# Patient Record
Sex: MALE | Race: WHITE | NOT HISPANIC OR LATINO | Employment: FULL TIME | URBAN - METROPOLITAN AREA
[De-identification: names, ages, dates, MRNs, and addresses within clinical notes are randomized per-mention and may not be internally consistent; named-entity substitution may affect disease eponyms.]

---

## 2019-10-25 ENCOUNTER — APPOINTMENT (OUTPATIENT)
Dept: GENERAL RADIOLOGY | Facility: HOSPITAL | Age: 59
End: 2019-10-25

## 2019-10-25 ENCOUNTER — HOSPITAL ENCOUNTER (OUTPATIENT)
Dept: GENERAL RADIOLOGY | Facility: HOSPITAL | Age: 59
Discharge: HOME | End: 2019-10-25
Admitting: CHIROPRACTOR

## 2019-10-25 ENCOUNTER — HOSPITAL ENCOUNTER (OUTPATIENT)
Dept: GENERAL RADIOLOGY | Facility: HOSPITAL | Age: 59
Discharge: HOME | End: 2019-10-25

## 2019-10-25 ENCOUNTER — TRANSCRIBE ORDERS (OUTPATIENT)
Dept: ADMINISTRATIVE | Facility: HOSPITAL | Age: 59
End: 2019-10-25

## 2019-10-25 ENCOUNTER — LAB (OUTPATIENT)
Dept: LAB | Facility: HOSPITAL | Age: 59
End: 2019-10-25

## 2019-10-25 DIAGNOSIS — M54.9 SEVERE BACK PAIN: Primary | ICD-10-CM

## 2019-10-25 DIAGNOSIS — Z82.69 FAMILY HISTORY OF ANKYLOSING SPONDYLITIS: ICD-10-CM

## 2019-10-25 DIAGNOSIS — Z87.39 HX OF ANKYLOSING SPONDYLITIS: ICD-10-CM

## 2019-10-25 DIAGNOSIS — M54.9 SEVERE BACK PAIN: ICD-10-CM

## 2019-10-25 DIAGNOSIS — M54.50 SEVERE LOW BACK PAIN: Primary | ICD-10-CM

## 2019-10-25 LAB
BASOPHILS # BLD AUTO: 0.07 10*3/MM3 (ref 0–0.2)
BASOPHILS NFR BLD AUTO: 1 % (ref 0–1.5)
CRP SERPL-MCNC: 0.4 MG/DL (ref 0–0.5)
DEPRECATED RDW RBC AUTO: 40.8 FL (ref 37–54)
EOSINOPHIL # BLD AUTO: 0.34 10*3/MM3 (ref 0–0.4)
EOSINOPHIL NFR BLD AUTO: 4.8 % (ref 0.3–6.2)
ERYTHROCYTE [DISTWIDTH] IN BLOOD BY AUTOMATED COUNT: 13.1 % (ref 12.3–15.4)
ERYTHROCYTE [SEDIMENTATION RATE] IN BLOOD: 3 MM/HR (ref 0–20)
HCT VFR BLD AUTO: 43.9 % (ref 37.5–51)
HGB BLD-MCNC: 14.8 G/DL (ref 13–17.7)
IMM GRANULOCYTES # BLD AUTO: 0.05 10*3/MM3 (ref 0–0.05)
IMM GRANULOCYTES NFR BLD AUTO: 0.7 % (ref 0–0.5)
LYMPHOCYTES # BLD AUTO: 0.86 10*3/MM3 (ref 0.7–3.1)
LYMPHOCYTES NFR BLD AUTO: 12.1 % (ref 19.6–45.3)
MCH RBC QN AUTO: 29.1 PG (ref 26.6–33)
MCHC RBC AUTO-ENTMCNC: 33.7 G/DL (ref 31.5–35.7)
MCV RBC AUTO: 86.2 FL (ref 79–97)
MONOCYTES # BLD AUTO: 0.68 10*3/MM3 (ref 0.1–0.9)
MONOCYTES NFR BLD AUTO: 9.6 % (ref 5–12)
NEUTROPHILS # BLD AUTO: 5.11 10*3/MM3 (ref 1.7–7)
NEUTROPHILS NFR BLD AUTO: 71.8 % (ref 42.7–76)
NRBC BLD AUTO-RTO: 0 /100 WBC (ref 0–0.2)
PLATELET # BLD AUTO: 240 10*3/MM3 (ref 140–450)
PMV BLD AUTO: 11.1 FL (ref 6–12)
RBC # BLD AUTO: 5.09 10*6/MM3 (ref 4.14–5.8)
WBC NRBC COR # BLD: 7.11 10*3/MM3 (ref 3.4–10.8)

## 2019-10-25 PROCEDURE — 85652 RBC SED RATE AUTOMATED: CPT

## 2019-10-25 PROCEDURE — 81374 HLA I TYPING 1 ANTIGEN LR: CPT

## 2019-10-25 PROCEDURE — 72170 X-RAY EXAM OF PELVIS: CPT

## 2019-10-25 PROCEDURE — 85025 COMPLETE CBC W/AUTO DIFF WBC: CPT

## 2019-10-25 PROCEDURE — 72110 X-RAY EXAM L-2 SPINE 4/>VWS: CPT

## 2019-10-25 PROCEDURE — 86140 C-REACTIVE PROTEIN: CPT

## 2019-10-25 PROCEDURE — 36415 COLL VENOUS BLD VENIPUNCTURE: CPT

## 2019-10-30 LAB — HLA-B27 QL NAA+PROBE: NEGATIVE

## 2023-09-01 ENCOUNTER — OFFICE VISIT (OUTPATIENT)
Dept: FAMILY MEDICINE CLINIC | Facility: CLINIC | Age: 63
End: 2023-09-01
Payer: COMMERCIAL

## 2023-09-01 VITALS
HEIGHT: 74 IN | OXYGEN SATURATION: 96 % | DIASTOLIC BLOOD PRESSURE: 92 MMHG | SYSTOLIC BLOOD PRESSURE: 132 MMHG | TEMPERATURE: 97.7 F | BODY MASS INDEX: 38.12 KG/M2 | WEIGHT: 297 LBS | HEART RATE: 72 BPM

## 2023-09-01 DIAGNOSIS — M79.671 RIGHT FOOT PAIN: ICD-10-CM

## 2023-09-01 DIAGNOSIS — L81.9 CHANGE IN PIGMENTED LESION OF FACE: ICD-10-CM

## 2023-09-01 DIAGNOSIS — I10 PRIMARY HYPERTENSION: ICD-10-CM

## 2023-09-01 DIAGNOSIS — M77.31 CALCANEAL SPUR OF RIGHT FOOT: ICD-10-CM

## 2023-09-01 DIAGNOSIS — E23.7 PITUITARY DISORDER: ICD-10-CM

## 2023-09-01 DIAGNOSIS — Z76.89 ENCOUNTER TO ESTABLISH CARE: Primary | ICD-10-CM

## 2023-09-01 RX ORDER — DESMOPRESSIN ACETATE 0.1 MG/1
TABLET ORAL
COMMUNITY
Start: 2013-08-15 | End: 2023-09-01 | Stop reason: SDUPTHER

## 2023-09-01 RX ORDER — LISINOPRIL 20 MG/1
1 TABLET ORAL DAILY
COMMUNITY
Start: 2023-07-23

## 2023-09-01 RX ORDER — DESMOPRESSIN ACETATE 0.1 MG/1
TABLET ORAL
Status: CANCELLED | OUTPATIENT
Start: 2023-09-01

## 2023-09-01 RX ORDER — BROMPHENIRAMINE MALEATE, PSEUDOEPHEDRINE HYDROCHLORIDE, AND DEXTROMETHORPHAN HYDROBROMIDE 2; 30; 10 MG/5ML; MG/5ML; MG/5ML
5 SYRUP ORAL 4 TIMES DAILY PRN
Qty: 150 ML | Refills: 1 | Status: SHIPPED | OUTPATIENT
Start: 2023-09-01

## 2023-09-01 RX ORDER — DESMOPRESSIN ACETATE 0.1 MG/1
0.1 TABLET ORAL 3 TIMES DAILY
Qty: 270 TABLET | Refills: 3 | Status: SHIPPED | OUTPATIENT
Start: 2023-09-01

## 2023-09-01 RX ORDER — AZITHROMYCIN 250 MG/1
TABLET, FILM COATED ORAL
Qty: 6 TABLET | Refills: 0 | Status: SHIPPED | OUTPATIENT
Start: 2023-09-01 | End: 2023-09-06

## 2023-09-01 NOTE — PROGRESS NOTES
"Chief Complaint  Establish Care, Hypertension, and Cough    Subjective          Sean Pedersen presents to [facility]      History of Present Illness    Paulie is a 63-year-old male patient who presents today to establish care, obtain med refill, and discuss cough.  Formally of Dr. Roberts, Randi Rizo and Rosemary Rivera.     Past medical history of hypertension. BP today 132/92.  Takes lisinopril 20 mg daily.  He is not in need of refill of that today.    He has pituitary gland dysfunction. Dx several years ago. Takes DDAVP and is in need of refill of this medication.    He has been with cough for two and half weeks. New coworker was in office and sick. He developed symptoms and took several Covid tests all of which were negative. Cough is productive and throat is scratchy. Cough was worse at night but has improved overall. Was taking NyQuil but stopped three nights ago. No fevers, chills, N/V/D.     Right foot with pain at arch for several years. Worse over time but some days are better than others.  Aggravated by certain movements.    He does report macular lesion on right cheek that has been there for some time however it does get painful at times and other times without pain.  Has not grown in size.    Objective     Vital Signs:   /92 (BP Location: Right arm, Patient Position: Sitting, Cuff Size: Adult)   Pulse 72   Temp 97.7 øF (36.5 øC) (Infrared)   Ht 188 cm (74\")   Wt 135 kg (297 lb)   SpO2 96%   BMI 38.13 kg/mý           Physical Exam  Vitals reviewed.   Constitutional:       Appearance: He is well-developed. He is obese.      Comments:      HENT:      Head: Normocephalic and atraumatic.      Nose: Nose normal.      Mouth/Throat:      Mouth: Mucous membranes are moist.      Pharynx: Oropharynx is clear.   Eyes:      Conjunctiva/sclera: Conjunctivae normal.      Pupils: Pupils are equal, round, and reactive to light.   Cardiovascular:      Rate and " Rhythm: Normal rate and regular rhythm.      Pulses: Normal pulses.      Heart sounds: Normal heart sounds. No murmur heard.  Pulmonary:      Effort: Pulmonary effort is normal. No respiratory distress.      Breath sounds: Normal breath sounds.   Musculoskeletal:         General: Normal range of motion.      Cervical back: Normal range of motion.      Right foot: Normal range of motion. No deformity, bunion, Charcot foot, foot drop or prominent metatarsal heads.        Feet:    Feet:      Comments: Tenderness of foot  Skin:     General: Skin is warm and dry.      Findings: Lesion present. No rash.             Comments: 0.25 cm asymmetric shaped macular lesion brown with jagged borders.  Slight color variations of light brown to dark brown.  No elevation.   Neurological:      Mental Status: He is alert and oriented to person, place, and time.   Psychiatric:         Behavior: Behavior normal.              Result Review :                                   Assessment and Plan      Diagnoses and all orders for this visit:    1. Encounter to establish care (Primary)    2. Pituitary disorder  -     desmopressin (DDAVP) 0.1 MG tablet; Take 1 tablet by mouth 3 (Three) Times a Day.  Dispense: 270 tablet; Refill: 3    3. Primary hypertension    4. Change in pigmented lesion of face  -     Ambulatory Referral to Dermatology    5. Right foot pain  Assessment & Plan:  Narrative & Impression   XR FOOT 3+ VW RIGHT     Date of Exam: 9/1/2023 9:29 AM EDT     Indication: right foot pain     Comparison: None available.     Findings:  No right foot fracture or joint malalignment is seen. There are small to moderate spurs at the Achilles and plantar tendon insertions upon the posterior calcaneus. Plantar arch is maintained. There is mild degenerative spurring at the dorsum of the   midfoot. Tibiotalar alignment is normal. There is mild degenerative narrowing of the first MTP joint and mild generalized narrowing of the interphalangeal  joints, without significant spurring. No erosive changes are identified. There are no osteolytic or   osteoblastic abnormalities.     IMPRESSION:  Impression:     1. Small to moderate spurs of the posterior calcaneus at the Achilles and plantar tendon insertions.  2. Mild degenerative changes of the forefoot and midfoot.  3. No acute findings       Orders:  -     XR Foot 3+ View Right (In Office)    6. Calcaneal spur of right foot    Other orders  -     brompheniramine-pseudoephedrine-DM 30-2-10 MG/5ML syrup; Take 5 mL by mouth 4 (Four) Times a Day As Needed for Congestion or Cough.  Dispense: 150 mL; Refill: 1  -     azithromycin (Zithromax Z-Arie) 250 MG tablet; Take 2 tablets the first day, then 1 tablet daily for 4 days.  Dispense: 6 tablet; Refill: 0        Patient foot pain is related to calcaneal spur which is what we discussed prior to him leaving.  X-ray was performed for confirmation.  Blood pressure stable doing well.  He does have a pituitary disorder but does not clarify what that would be.  We will obtain records from former PCPs.  Regarding pigmented skin lesion on right cheek, we will get him in with dermatology for further evaluation.          Follow Up       No follow-ups on file.      Patient was given instructions and counseling regarding his condition or for health maintenance advice. Please see specific information pulled into the AVS if appropriate.     Vanesa Hernández, APRN9/1/202310:26 EDT  This note has been electronically signed

## 2023-09-01 NOTE — LETTER
September 1, 2023     Patient: Sean Pedersen   YOB: 1960   Date of Visit: 9/1/2023       To Whom It May Concern:    It is my medical opinion that Sean Pedersen  was with medical appt today. .           Sincerely,        ALRENE Pedroza    CC: No Recipients

## 2023-09-01 NOTE — PROGRESS NOTES
Please advise Reese he is with moderate spurs of the foot and at the plantar tendon.  Also arthritis finding.  Would recommend anti-inflammatory such as ibuprofen 800 mg 2-3 times daily as well as rolling foot with a frozen water bottle to help stretch and alleviate tension and inflammation.

## 2023-09-01 NOTE — ASSESSMENT & PLAN NOTE
Narrative & Impression   XR FOOT 3+ VW RIGHT     Date of Exam: 9/1/2023 9:29 AM EDT     Indication: right foot pain     Comparison: None available.     Findings:  No right foot fracture or joint malalignment is seen. There are small to moderate spurs at the Achilles and plantar tendon insertions upon the posterior calcaneus. Plantar arch is maintained. There is mild degenerative spurring at the dorsum of the   midfoot. Tibiotalar alignment is normal. There is mild degenerative narrowing of the first MTP joint and mild generalized narrowing of the interphalangeal joints, without significant spurring. No erosive changes are identified. There are no osteolytic or   osteoblastic abnormalities.     IMPRESSION:  Impression:     1. Small to moderate spurs of the posterior calcaneus at the Achilles and plantar tendon insertions.  2. Mild degenerative changes of the forefoot and midfoot.  3. No acute findings

## 2023-11-09 RX ORDER — LISINOPRIL 20 MG/1
20 TABLET ORAL DAILY
Qty: 90 TABLET | Refills: 0 | Status: SHIPPED | OUTPATIENT
Start: 2023-11-09

## 2023-11-09 NOTE — TELEPHONE ENCOUNTER
Incoming Refill Request      Medication requested (name and dose):   lisinopril (PRINIVIL,ZESTRIL) 20 MG tablet  1 tablet, Daily       Pharmacy where request should be sent:   [facility]/pharmacy #[id number] - [place], [place] - [address] - [phone]  - [phone]       Additional details provided by patient: NONE    Best call back number: [phone]    Does the patient have less than a 3 day supply:  [x] Yes  [] No    Alicja Tavarez Rep  11/09/23, 08:46 EST

## 2024-02-05 RX ORDER — LISINOPRIL 20 MG/1
20 TABLET ORAL DAILY
Qty: 90 TABLET | Refills: 3 | Status: SHIPPED | OUTPATIENT
Start: 2024-02-05

## 2024-09-16 ENCOUNTER — OFFICE VISIT (OUTPATIENT)
Dept: FAMILY MEDICINE CLINIC | Facility: CLINIC | Age: 64
End: 2024-09-16
Payer: COMMERCIAL

## 2024-09-16 VITALS
WEIGHT: 293 LBS | OXYGEN SATURATION: 95 % | SYSTOLIC BLOOD PRESSURE: 117 MMHG | HEIGHT: 74 IN | DIASTOLIC BLOOD PRESSURE: 78 MMHG | HEART RATE: 71 BPM | TEMPERATURE: 98 F | BODY MASS INDEX: 37.6 KG/M2

## 2024-09-16 DIAGNOSIS — Z13.220 SCREENING FOR CHOLESTEROL LEVEL: ICD-10-CM

## 2024-09-16 DIAGNOSIS — I10 PRIMARY HYPERTENSION: ICD-10-CM

## 2024-09-16 DIAGNOSIS — Z12.5 ENCOUNTER FOR PROSTATE CANCER SCREENING: ICD-10-CM

## 2024-09-16 DIAGNOSIS — E23.7 PITUITARY DISORDER: ICD-10-CM

## 2024-09-16 DIAGNOSIS — Z13.1 SCREENING FOR DIABETES MELLITUS: ICD-10-CM

## 2024-09-16 DIAGNOSIS — E23.2 DIABETES INSIPIDUS: Primary | ICD-10-CM

## 2024-09-16 PROCEDURE — 99214 OFFICE O/P EST MOD 30 MIN: CPT | Performed by: FAMILY MEDICINE

## 2024-09-16 RX ORDER — LISINOPRIL 20 MG/1
20 TABLET ORAL DAILY
Qty: 90 TABLET | Refills: 3 | Status: SHIPPED | OUTPATIENT
Start: 2024-09-16

## 2024-09-16 RX ORDER — DESMOPRESSIN ACETATE 0.1 MG/1
0.1 TABLET ORAL 3 TIMES DAILY
Qty: 270 TABLET | Refills: 3 | Status: SHIPPED | OUTPATIENT
Start: 2024-09-16

## 2024-09-17 DIAGNOSIS — N28.9 RENAL INSUFFICIENCY: ICD-10-CM

## 2024-09-17 DIAGNOSIS — I10 PRIMARY HYPERTENSION: Primary | ICD-10-CM

## 2024-09-17 LAB
ALBUMIN SERPL-MCNC: 4.6 G/DL (ref 3.9–4.9)
ALP SERPL-CCNC: 88 IU/L (ref 44–121)
ALT SERPL-CCNC: 33 IU/L (ref 0–44)
AST SERPL-CCNC: 21 IU/L (ref 0–40)
BASOPHILS # BLD AUTO: 0.1 X10E3/UL (ref 0–0.2)
BASOPHILS NFR BLD AUTO: 1 %
BILIRUB SERPL-MCNC: 0.4 MG/DL (ref 0–1.2)
BUN SERPL-MCNC: 25 MG/DL (ref 8–27)
BUN/CREAT SERPL: 18 (ref 10–24)
CALCIUM SERPL-MCNC: 10.4 MG/DL (ref 8.6–10.2)
CHLORIDE SERPL-SCNC: 102 MMOL/L (ref 96–106)
CHOLEST SERPL-MCNC: 166 MG/DL (ref 100–199)
CO2 SERPL-SCNC: 21 MMOL/L (ref 20–29)
CREAT SERPL-MCNC: 1.37 MG/DL (ref 0.76–1.27)
EGFRCR SERPLBLD CKD-EPI 2021: 58 ML/MIN/1.73
EOSINOPHIL # BLD AUTO: 0.3 X10E3/UL (ref 0–0.4)
EOSINOPHIL NFR BLD AUTO: 4 %
ERYTHROCYTE [DISTWIDTH] IN BLOOD BY AUTOMATED COUNT: 13.3 % (ref 11.6–15.4)
GLOBULIN SER CALC-MCNC: 3.1 G/DL (ref 1.5–4.5)
GLUCOSE SERPL-MCNC: 98 MG/DL (ref 70–99)
HBA1C MFR BLD: 6 % (ref 4.8–5.6)
HCT VFR BLD AUTO: 48.9 % (ref 37.5–51)
HDLC SERPL-MCNC: 36 MG/DL
HGB BLD-MCNC: 15.8 G/DL (ref 13–17.7)
IMM GRANULOCYTES # BLD AUTO: 0 X10E3/UL (ref 0–0.1)
IMM GRANULOCYTES NFR BLD AUTO: 0 %
LDLC SERPL CALC-MCNC: 96 MG/DL (ref 0–99)
LYMPHOCYTES # BLD AUTO: 1.4 X10E3/UL (ref 0.7–3.1)
LYMPHOCYTES NFR BLD AUTO: 18 %
MCH RBC QN AUTO: 29.9 PG (ref 26.6–33)
MCHC RBC AUTO-ENTMCNC: 32.3 G/DL (ref 31.5–35.7)
MCV RBC AUTO: 92 FL (ref 79–97)
MONOCYTES # BLD AUTO: 0.7 X10E3/UL (ref 0.1–0.9)
MONOCYTES NFR BLD AUTO: 9 %
NEUTROPHILS # BLD AUTO: 5.1 X10E3/UL (ref 1.4–7)
NEUTROPHILS NFR BLD AUTO: 68 %
PLATELET # BLD AUTO: 264 X10E3/UL (ref 150–450)
POTASSIUM SERPL-SCNC: 5.1 MMOL/L (ref 3.5–5.2)
PROT SERPL-MCNC: 7.7 G/DL (ref 6–8.5)
PSA SERPL-MCNC: 1.1 NG/ML (ref 0–4)
RBC # BLD AUTO: 5.29 X10E6/UL (ref 4.14–5.8)
SODIUM SERPL-SCNC: 139 MMOL/L (ref 134–144)
TRIGL SERPL-MCNC: 199 MG/DL (ref 0–149)
VLDLC SERPL CALC-MCNC: 34 MG/DL (ref 5–40)
WBC # BLD AUTO: 7.5 X10E3/UL (ref 3.4–10.8)

## 2024-10-02 ENCOUNTER — TELEPHONE (OUTPATIENT)
Dept: FAMILY MEDICINE CLINIC | Facility: CLINIC | Age: 64
End: 2024-10-02
Payer: COMMERCIAL

## 2024-10-02 NOTE — TELEPHONE ENCOUNTER
"    Caller: Sean Pedersen \"Paulie\"    Relationship to patient: Self    Best call back number: [phone]    Patient is needing:     WOULD LIKE A CALL TO DISCUSS THE REMARKS FROM HIS LABS FROM DR. MURGUIA.     HE WOULD JUST LIKE TO CLARIFY IF HE IS SUPPOSED TO REMAIN OFF OR ON THE MEDICATION LISINOPRIL.     PLEASE CALL TO DISCUSS.     ADDITIONALLY, IF HE IS SUPPOSED TO STAY ON THE LISINOPRIL, [facility] DOES NOT HAVE RECORD OF THE PRESCRIPTION FROM 9.16.24.       "

## 2024-10-07 DIAGNOSIS — I10 PRIMARY HYPERTENSION: Primary | ICD-10-CM

## 2024-10-07 RX ORDER — AMLODIPINE BESYLATE 5 MG/1
5 TABLET ORAL DAILY
Qty: 30 TABLET | Refills: 2 | Status: SHIPPED | OUTPATIENT
Start: 2024-10-07

## 2024-10-29 DIAGNOSIS — I10 PRIMARY HYPERTENSION: Primary | ICD-10-CM

## 2024-10-29 RX ORDER — AMLODIPINE BESYLATE 10 MG/1
10 TABLET ORAL DAILY
Qty: 90 TABLET | Refills: 1 | Status: SHIPPED | OUTPATIENT
Start: 2024-10-29

## 2024-11-18 ENCOUNTER — OFFICE VISIT (OUTPATIENT)
Dept: FAMILY MEDICINE CLINIC | Facility: CLINIC | Age: 64
End: 2024-11-18
Payer: COMMERCIAL

## 2024-11-18 VITALS
DIASTOLIC BLOOD PRESSURE: 88 MMHG | WEIGHT: 294 LBS | SYSTOLIC BLOOD PRESSURE: 133 MMHG | BODY MASS INDEX: 37.73 KG/M2 | TEMPERATURE: 97.8 F | OXYGEN SATURATION: 96 % | HEART RATE: 87 BPM | HEIGHT: 74 IN

## 2024-11-18 DIAGNOSIS — I10 PRIMARY HYPERTENSION: Primary | ICD-10-CM

## 2024-11-18 DIAGNOSIS — N28.9 RENAL INSUFFICIENCY: ICD-10-CM

## 2024-11-18 DIAGNOSIS — Z23 NEED FOR INFLUENZA VACCINATION: ICD-10-CM

## 2024-11-18 PROCEDURE — 99214 OFFICE O/P EST MOD 30 MIN: CPT | Performed by: FAMILY MEDICINE

## 2024-11-18 PROCEDURE — 90471 IMMUNIZATION ADMIN: CPT | Performed by: FAMILY MEDICINE

## 2024-11-18 PROCEDURE — 90656 IIV3 VACC NO PRSV 0.5 ML IM: CPT | Performed by: FAMILY MEDICINE

## 2024-11-18 RX ORDER — LOSARTAN POTASSIUM 50 MG/1
50 TABLET ORAL DAILY
Qty: 30 TABLET | Refills: 1 | Status: SHIPPED | OUTPATIENT
Start: 2024-11-18

## 2024-11-18 NOTE — PROGRESS NOTES
Answers submitted by the patient for this visit:  Primary Reason for Visit (Submitted on 11/17/2024)  What is the primary reason for your visit?: High Blood Pressure  High Blood Pressure Questionnaire (Submitted on 11/17/2024)  Chief Complaint: Hypertension  Chronicity: recurrent  Onset: more than 1 year ago  Progression since onset: stable  blurred vision: Yes  headaches: Yes  malaise/fatigue: Yes  Agents associated with hypertension: steroids  Compliance problems: diet, exercise  Subjective   Sean Pedersen is a 64 y.o. male.   Chief Complaint   Patient presents with    Hypertension       History of Present Illness   64 y.o. male with history of hypertension, diabetes insipidus presents to the office today for follow-up.  I met him not quite 2 months ago.  Blood pressure looked good at that time.  Overall his condition was stable.  We did lab work.  Creatinine was 1.37 with a GFR of 58.  This was previously unknown to both of us.  I had him hold his lisinopril.  GFR went up to 68 and creatinine came down to 1.2.  I transitioned him to amlodipine.  We stayed in touch via [employer].  His blood pressure was 133/88.  After it was off lisinopril for a while blood pressure went up.  He sent numbers in.  144/98, 135/88, 153/99.  I sent in a prescription for 5 mg of amlodipine we titrated it up to 10 mg after his blood pressures remained in the high 140s low 150s.    Since getting up to the 10 mg dose of amlodipine, his blood pressure    History of Present Illness  The patient presents for evaluation of blood pressure.    He reports that his blood pressure readings at home have been elevated despite starting a new medication. He is uncertain about the accuracy of his home blood pressure monitor. After lunch, he recorded a reading of 152/95, which he considered high. Prior to his visit today, he measured his blood pressure again and found it to be 166/96. Over the past few days, his readings have ranged from 130/88 to  152/95. He recalls that while on lisinopril, his blood pressure was consistently around 120/80.     He has been experiencing mild headaches, which he did not have before. He takes amlodipine at 7:00 AM daily and has noticed that he feels extremely tired around 9:00 AM, even after having a good night's sleep. He is concerned about this sudden onset of fatigue.    Supplemental Information:  He tore his biceps tendon on 10/01/2024. He had an MRI but did not have to undergo surgery.      Patient Active Problem List    Diagnosis Date Noted    Diabetes insipidus 09/16/2024    Pituitary disorder 09/01/2023    Primary hypertension 09/01/2023    Encounter to establish care 09/01/2023    Right foot pain 09/01/2023    Change in pigmented lesion of face 09/01/2023    Calcaneal spur of right foot 09/01/2023           Past Surgical History:   Procedure Laterality Date    CHOLECYSTECTOMY      COLONOSCOPY      DISTAL BICEPS TENDON REPAIR      EYE SURGERY      FRACTURE SURGERY  02/21/2022    Distal bicep     Current Outpatient Medications on File Prior to Visit   Medication Sig    amLODIPine (NORVASC) 10 MG tablet Take 1 tablet by mouth Daily.    desmopressin (DDAVP) 0.1 MG tablet Take 1 tablet by mouth 3 (Three) Times a Day.     No current facility-administered medications on file prior to visit.     No Known Allergies  Social History     Socioeconomic History    Marital status:    Tobacco Use    Smoking status: Never     Passive exposure: Never    Smokeless tobacco: Never   Vaping Use    Vaping status: Never Used   Substance and Sexual Activity    Alcohol use: Never    Drug use: Never    Sexual activity: Not Currently     Partners: Female     Family History   Problem Relation Age of Onset    Asthma Son     Vision loss Brother     Hearing loss Mother         101 yrs old       Review of Systems    Objective   /88 (BP Location: Left arm, Patient Position: Sitting, Cuff Size: Adult)   Pulse 87   Temp 97.8 °F (36.6 °C)  "(Infrared)   Ht 188 cm (74.02\")   Wt 133 kg (294 lb)   SpO2 96%   BMI 37.73 kg/m²   Physical Exam  Constitutional:       Appearance: He is well-developed.      Comments:      HENT:      Head: Normocephalic and atraumatic.   Eyes:      Conjunctiva/sclera: Conjunctivae normal.   Cardiovascular:      Rate and Rhythm: Normal rate.   Pulmonary:      Effort: Pulmonary effort is normal.   Musculoskeletal:         General: Normal range of motion.      Cervical back: Normal range of motion.   Skin:     General: Skin is warm and dry.      Findings: No rash.   Neurological:      Mental Status: He is alert and oriented to person, place, and time.   Psychiatric:         Behavior: Behavior normal.       Physical Exam        Results Encounter on 10/01/2024   Component Date Value Ref Range Status    Glucose 09/30/2024 89  70 - 99 mg/dL Final    BUN 09/30/2024 18  8 - 27 mg/dL Final    Creatinine 09/30/2024 1.20  0.76 - 1.27 mg/dL Final    EGFR Result 09/30/2024 68  >59 mL/min/1.73 Final    BUN/Creatinine Ratio 09/30/2024 15  10 - 24 Final    Sodium 09/30/2024 136  134 - 144 mmol/L Final    Potassium 09/30/2024 4.9  3.5 - 5.2 mmol/L Final    Chloride 09/30/2024 102  96 - 106 mmol/L Final    Total CO2 09/30/2024 19 (L)  20 - 29 mmol/L Final    Calcium 09/30/2024 9.4  8.6 - 10.2 mg/dL Final    Total Protein 09/30/2024 7.1  6.0 - 8.5 g/dL Final    Albumin 09/30/2024 4.4  3.9 - 4.9 g/dL Final    Globulin 09/30/2024 2.7  1.5 - 4.5 g/dL Final    Total Bilirubin 09/30/2024 0.4  0.0 - 1.2 mg/dL Final    Alkaline Phosphatase 09/30/2024 80  44 - 121 IU/L Final    AST (SGOT) 09/30/2024 21  0 - 40 IU/L Final    ALT (SGPT) 09/30/2024 28  0 - 44 IU/L Final   Office Visit on 09/16/2024   Component Date Value Ref Range Status    WBC 09/16/2024 7.5  3.4 - 10.8 x10E3/uL Final    RBC 09/16/2024 5.29  4.14 - 5.80 x10E6/uL Final    Hemoglobin 09/16/2024 15.8  13.0 - 17.7 g/dL Final    Hematocrit 09/16/2024 48.9  37.5 - 51.0 % Final    MCV 09/16/2024 " 92  79 - 97 fL Final    MCH 09/16/2024 29.9  26.6 - 33.0 pg Final    MCHC 09/16/2024 32.3  31.5 - 35.7 g/dL Final    RDW 09/16/2024 13.3  11.6 - 15.4 % Final    Platelets 09/16/2024 264  150 - 450 x10E3/uL Final    Neutrophil Rel % 09/16/2024 68  Not Estab. % Final    Lymphocyte Rel % 09/16/2024 18  Not Estab. % Final    Monocyte Rel % 09/16/2024 9  Not Estab. % Final    Eosinophil Rel % 09/16/2024 4  Not Estab. % Final    Basophil Rel % 09/16/2024 1  Not Estab. % Final    Neutrophils Absolute 09/16/2024 5.1  1.4 - 7.0 x10E3/uL Final    Lymphocytes Absolute 09/16/2024 1.4  0.7 - 3.1 x10E3/uL Final    Monocytes Absolute 09/16/2024 0.7  0.1 - 0.9 x10E3/uL Final    Eosinophils Absolute 09/16/2024 0.3  0.0 - 0.4 x10E3/uL Final    Basophils Absolute 09/16/2024 0.1  0.0 - 0.2 x10E3/uL Final    Immature Granulocyte Rel % 09/16/2024 0  Not Estab. % Final    Immature Grans Absolute 09/16/2024 0.0  0.0 - 0.1 x10E3/uL Final    Glucose 09/16/2024 98  70 - 99 mg/dL Final    BUN 09/16/2024 25  8 - 27 mg/dL Final    Creatinine 09/16/2024 1.37 (H)  0.76 - 1.27 mg/dL Final    EGFR Result 09/16/2024 58 (L)  >59 mL/min/1.73 Final    BUN/Creatinine Ratio 09/16/2024 18  10 - 24 Final    Sodium 09/16/2024 139  134 - 144 mmol/L Final    Potassium 09/16/2024 5.1  3.5 - 5.2 mmol/L Final    Chloride 09/16/2024 102  96 - 106 mmol/L Final    Total CO2 09/16/2024 21  20 - 29 mmol/L Final    Calcium 09/16/2024 10.4 (H)  8.6 - 10.2 mg/dL Final    Total Protein 09/16/2024 7.7  6.0 - 8.5 g/dL Final    Albumin 09/16/2024 4.6  3.9 - 4.9 g/dL Final    Globulin 09/16/2024 3.1  1.5 - 4.5 g/dL Final    Total Bilirubin 09/16/2024 0.4  0.0 - 1.2 mg/dL Final    Alkaline Phosphatase 09/16/2024 88  44 - 121 IU/L Final    AST (SGOT) 09/16/2024 21  0 - 40 IU/L Final    ALT (SGPT) 09/16/2024 33  0 - 44 IU/L Final    Total Cholesterol 09/16/2024 166  100 - 199 mg/dL Final    Triglycerides 09/16/2024 199 (H)  0 - 149 mg/dL Final    HDL Cholesterol 09/16/2024 36  (L)  >39 mg/dL Final    VLDL Cholesterol Geraldo 09/16/2024 34  5 - 40 mg/dL Final    LDL Chol Calc (NIH) 09/16/2024 96  0 - 99 mg/dL Final    PSA 09/16/2024 1.1  0.0 - 4.0 ng/mL Final    Comment: Roche ECLIA methodology.  According to the American Urological Association, Serum PSA should  decrease and remain at undetectable levels after radical  prostatectomy. The AUA defines biochemical recurrence as an initial  PSA value 0.2 ng/mL or greater followed by a subsequent confirmatory  PSA value 0.2 ng/mL or greater.  Values obtained with different assay methods or kits cannot be used  interchangeably. Results cannot be interpreted as absolute evidence  of the presence or absence of malignant disease.      Hemoglobin A1C 09/16/2024 6.0 (H)  4.8 - 5.6 % Final    Comment:          Prediabetes: 5.7 - 6.4           Diabetes: >6.4           Glycemic control for adults with diabetes: <7.0       Results  Laboratory Studies  GFR increased from 58 to normal range. Total cholesterol below 200, LDL below 100, Triglycerides 199.         Assessment & Plan   Diagnoses and all orders for this visit:    1. Primary hypertension (Primary)  -     losartan (Cozaar) 50 MG tablet; Take 1 tablet by mouth Daily.  Dispense: 30 tablet; Refill: 1    2. Renal insufficiency  -     Basic Metabolic Panel  -     Basic Metabolic Panel; Future    3. Need for influenza vaccination  -     Fluzone >6mos      Assessment & Plan  1. Hypertension.  His blood pressure remains uncontrolled with amlodipine, with readings ranging from 130/88 to 166/96. He experiences adverse effects such as fatigue and headaches. Amlodipine will be discontinued. Losartan 50 mg will be initiated, and blood work will be repeated today and in 2 weeks to monitor kidney function. He is advised to take losartan once daily at a consistent time.     2. Chronic Kidney Disease Stage 3a.  His GFR was 58 in September 2024, indicating stage 3 chronic kidney disease. After discontinuing  lisinopril, his GFR improved by 10 points, suggesting that lisinopril may have been causing renal insufficiency. A comprehensive kidney workup, including urine samples and ultrasound, will be conducted to rule out other kidney diseases if his elevated creatinine persists. We discussed the possibility of doing the workup now.  He is comfortable rechecking his creatinine today, taking losartan for a few weeks, monitoring his blood pressure and repeating labs in 2 weeks.  We will adjust the losartan based on his home blood pressure readings within the next week and repeat a follow-up blood test in about 2 weeks.  If creatinine is still up, we will need the additional workup.  3. Health Maintenance.  He will receive an influenza vaccine today.          Call with any problems or concerns before next visit       Return if symptoms worsen or fail to improve.  Patient or patient representative verbalized consent for the use of Ambient Listening during the visit with  Deepti Horvath MD for chart documentation. 11/18/2024  17:14 EST    Part of this note may be an electronic transcription/translation of spoken language to printed text using the Dragon Dictation System    Deepti Horvath MD11/18/202417:14 EST  This note has been electronically signed

## 2024-11-19 LAB
BUN SERPL-MCNC: 18 MG/DL (ref 8–27)
BUN/CREAT SERPL: 14 (ref 10–24)
CALCIUM SERPL-MCNC: 9.8 MG/DL (ref 8.6–10.2)
CHLORIDE SERPL-SCNC: 105 MMOL/L (ref 96–106)
CO2 SERPL-SCNC: 21 MMOL/L (ref 20–29)
CREAT SERPL-MCNC: 1.26 MG/DL (ref 0.76–1.27)
EGFRCR SERPLBLD CKD-EPI 2021: 64 ML/MIN/1.73
GLUCOSE SERPL-MCNC: 149 MG/DL (ref 70–99)
POTASSIUM SERPL-SCNC: 4.4 MMOL/L (ref 3.5–5.2)
SODIUM SERPL-SCNC: 141 MMOL/L (ref 134–144)

## 2024-11-25 DIAGNOSIS — E23.2 DIABETES INSIPIDUS: Primary | ICD-10-CM

## 2024-12-11 DIAGNOSIS — I10 PRIMARY HYPERTENSION: ICD-10-CM

## 2024-12-11 RX ORDER — LOSARTAN POTASSIUM 50 MG/1
50 TABLET ORAL DAILY
Qty: 90 TABLET | Refills: 3 | Status: SHIPPED | OUTPATIENT
Start: 2024-12-11

## 2024-12-16 ENCOUNTER — CLINICAL SUPPORT (OUTPATIENT)
Dept: FAMILY MEDICINE CLINIC | Facility: CLINIC | Age: 64
End: 2024-12-16
Payer: COMMERCIAL

## 2024-12-16 VITALS — DIASTOLIC BLOOD PRESSURE: 95 MMHG | SYSTOLIC BLOOD PRESSURE: 150 MMHG

## 2024-12-16 DIAGNOSIS — I10 PRIMARY HYPERTENSION: Primary | ICD-10-CM
